# Patient Record
Sex: FEMALE | Race: WHITE | NOT HISPANIC OR LATINO | Employment: OTHER | URBAN - METROPOLITAN AREA
[De-identification: names, ages, dates, MRNs, and addresses within clinical notes are randomized per-mention and may not be internally consistent; named-entity substitution may affect disease eponyms.]

---

## 2024-05-15 ENCOUNTER — TELEPHONE (OUTPATIENT)
Dept: PRIMARY CARE | Facility: CLINIC | Age: 79
End: 2024-05-15
Payer: MEDICARE

## 2024-05-17 ENCOUNTER — TELEPHONE (OUTPATIENT)
Dept: PRIMARY CARE | Facility: CLINIC | Age: 79
End: 2024-05-17
Payer: MEDICARE

## 2024-05-17 DIAGNOSIS — E78.5 HYPERLIPIDEMIA, UNSPECIFIED HYPERLIPIDEMIA TYPE: ICD-10-CM

## 2024-05-17 DIAGNOSIS — R53.83 FATIGUE, UNSPECIFIED TYPE: ICD-10-CM

## 2024-05-17 DIAGNOSIS — E55.9 VITAMIN D DEFICIENCY: Primary | ICD-10-CM

## 2024-05-17 DIAGNOSIS — F32.A DEPRESSION, UNSPECIFIED DEPRESSION TYPE: ICD-10-CM

## 2024-05-17 DIAGNOSIS — E53.8 VITAMIN B12 DEFICIENCY: ICD-10-CM

## 2024-05-20 ENCOUNTER — TELEPHONE (OUTPATIENT)
Dept: PRIMARY CARE | Facility: CLINIC | Age: 79
End: 2024-05-20
Payer: MEDICARE

## 2024-05-20 NOTE — TELEPHONE ENCOUNTER
[place] [facility] [place] to know if she needed to  orders.   I called pt and left message that I would fax them to [facility].

## 2024-05-23 LAB
NON-UH HIE A/G RATIO: 1.2
NON-UH HIE ALB: 3.9 G/DL (ref 3.4–5)
NON-UH HIE ALK PHOS: 96 UNIT/L (ref 45–117)
NON-UH HIE BASO COUNT: 0.03 X1000 (ref 0–0.2)
NON-UH HIE BASOS %: 0.6 %
NON-UH HIE BILIRUBIN, TOTAL: 0.9 MG/DL (ref 0.3–1.2)
NON-UH HIE BUN/CREAT RATIO: 35
NON-UH HIE BUN: 28 MG/DL (ref 9–23)
NON-UH HIE CALCIUM: 9.6 MG/DL (ref 8.7–10.4)
NON-UH HIE CALCULATED LDL CHOLESTEROL: 147 MG/DL (ref 60–130)
NON-UH HIE CALCULATED OSMOLALITY: 289 MOSM/KG (ref 275–295)
NON-UH HIE CHLORIDE: 110 MMOL/L (ref 98–107)
NON-UH HIE CHOLESTEROL: 220 MG/DL (ref 100–200)
NON-UH HIE CO2, VENOUS: 26 MMOL/L (ref 20–31)
NON-UH HIE CREATININE: 0.8 MG/DL (ref 0.5–0.8)
NON-UH HIE DIFF?: NO
NON-UH HIE EOS COUNT: 0.14 X1000 (ref 0–0.5)
NON-UH HIE EOSIN %: 2.8 %
NON-UH HIE GFR AA: >60
NON-UH HIE GLOBULIN: 3.2 G/DL
NON-UH HIE GLOMERULAR FILTRATION RATE: >60 ML/MIN/1.73M?
NON-UH HIE GLUCOSE: 107 MG/DL (ref 74–106)
NON-UH HIE GOT: 19 UNIT/L (ref 15–37)
NON-UH HIE GPT: 29 UNIT/L (ref 10–49)
NON-UH HIE HCT: 43.8 % (ref 36–46)
NON-UH HIE HDL CHOLESTEROL: 48 MG/DL (ref 40–60)
NON-UH HIE HGB: 14.7 G/DL (ref 12–16)
NON-UH HIE INSTR WBC: 4.8
NON-UH HIE K: 4.5 MMOL/L (ref 3.5–5.1)
NON-UH HIE LYMPH %: 32.6 %
NON-UH HIE LYMPH COUNT: 1.55 X1000 (ref 1.2–4.8)
NON-UH HIE MCH: 31 PG (ref 27–34)
NON-UH HIE MCHC: 33.5 G/DL (ref 32–37)
NON-UH HIE MCV: 92.7 FL (ref 80–100)
NON-UH HIE MONO %: 9.9 %
NON-UH HIE MONO COUNT: 0.47 X1000 (ref 0.1–1)
NON-UH HIE MPV: 8.3 FL (ref 7.4–10.4)
NON-UH HIE NA: 142 MMOL/L (ref 135–145)
NON-UH HIE NEUTROPHIL %: 54 %
NON-UH HIE NEUTROPHIL COUNT (ANC): 2.57 X1000 (ref 1.4–8.8)
NON-UH HIE NUCLEATED RBC: 0 /100WBC
NON-UH HIE PLATELET: 185 X10 (ref 150–450)
NON-UH HIE RBC: 4.73 X10 (ref 4.2–5.4)
NON-UH HIE RDW: 13.8 % (ref 11.5–14.5)
NON-UH HIE TOTAL CHOL/HDL CHOL RATIO: 4.6
NON-UH HIE TOTAL PROTEIN: 7.1 G/DL (ref 5.7–8.2)
NON-UH HIE TRIGLYCERIDES: 123 MG/DL (ref 30–150)
NON-UH HIE TSH: 1.61 UIU/ML (ref 0.55–4.78)
NON-UH HIE VIT D 25: 50 NG/ML
NON-UH HIE VITAMIN B12: 566 PG/ML (ref 211–911)
NON-UH HIE WBC: 4.8 X10 (ref 4.5–11)

## 2024-05-30 ENCOUNTER — OFFICE VISIT (OUTPATIENT)
Dept: PRIMARY CARE | Facility: CLINIC | Age: 79
End: 2024-05-30
Payer: MEDICARE

## 2024-05-30 VITALS
TEMPERATURE: 98 F | HEART RATE: 67 BPM | DIASTOLIC BLOOD PRESSURE: 80 MMHG | BODY MASS INDEX: 26.24 KG/M2 | SYSTOLIC BLOOD PRESSURE: 124 MMHG | HEIGHT: 61 IN | WEIGHT: 139 LBS

## 2024-05-30 DIAGNOSIS — Z00.00 ROUTINE GENERAL MEDICAL EXAMINATION AT HEALTH CARE FACILITY: Primary | ICD-10-CM

## 2024-05-30 DIAGNOSIS — F51.01 PRIMARY INSOMNIA: ICD-10-CM

## 2024-05-30 PROBLEM — R10.9 ABDOMINAL PAIN: Status: ACTIVE | Noted: 2024-05-30

## 2024-05-30 PROBLEM — M19.049 OSTEOARTHRITIS OF HAND: Status: ACTIVE | Noted: 2024-05-30

## 2024-05-30 PROBLEM — F39 MOOD DISORDER (CMS-HCC): Status: ACTIVE | Noted: 2024-05-30

## 2024-05-30 PROBLEM — R03.0 ELEVATED BLOOD PRESSURE READING: Status: ACTIVE | Noted: 2024-05-30

## 2024-05-30 PROBLEM — M21.612 BUNION, LEFT FOOT: Status: ACTIVE | Noted: 2024-05-30

## 2024-05-30 PROBLEM — E55.9 VITAMIN D DEFICIENCY: Status: ACTIVE | Noted: 2024-05-30

## 2024-05-30 PROBLEM — F32.9 MDD (MAJOR DEPRESSIVE DISORDER): Status: ACTIVE | Noted: 2024-05-30

## 2024-05-30 PROCEDURE — 1160F RVW MEDS BY RX/DR IN RCRD: CPT | Performed by: FAMILY MEDICINE

## 2024-05-30 PROCEDURE — G0439 PPPS, SUBSEQ VISIT: HCPCS | Performed by: FAMILY MEDICINE

## 2024-05-30 PROCEDURE — 1159F MED LIST DOCD IN RCRD: CPT | Performed by: FAMILY MEDICINE

## 2024-05-30 RX ORDER — BUPROPION HYDROCHLORIDE 300 MG/1
1 TABLET ORAL DAILY
COMMUNITY
Start: 2021-05-24 | End: 2024-05-30 | Stop reason: WASHOUT

## 2024-05-30 RX ORDER — CYANOCOBALAMIN (VITAMIN B-12) 250 MCG
250 TABLET ORAL DAILY
COMMUNITY

## 2024-05-30 RX ORDER — RISANKIZUMAB-RZAA 150 MG/ML
INJECTION SUBCUTANEOUS
COMMUNITY

## 2024-05-30 RX ORDER — TRAZODONE HYDROCHLORIDE 50 MG/1
TABLET ORAL
Qty: 60 TABLET | Refills: 3 | Status: SHIPPED | OUTPATIENT
Start: 2024-05-30

## 2024-05-30 RX ORDER — ELECTROLYTES/DEXTROSE
1 SOLUTION, ORAL ORAL DAILY
COMMUNITY

## 2024-05-30 RX ORDER — OMEGA-3-ACID ETHYL ESTERS 1 G/1
1 CAPSULE, LIQUID FILLED ORAL 2 TIMES DAILY
COMMUNITY

## 2024-05-30 RX ORDER — ACETAMINOPHEN 500 MG
1 TABLET ORAL DAILY
COMMUNITY

## 2024-05-30 RX ORDER — MELOXICAM 15 MG/1
15 TABLET ORAL EVERY OTHER DAY
COMMUNITY

## 2024-05-30 RX ORDER — USTEKINUMAB 45 MG/.5ML
INJECTION, SOLUTION SUBCUTANEOUS
COMMUNITY
End: 2024-05-30 | Stop reason: WASHOUT

## 2024-05-30 RX ORDER — LIDOCAINE AND PRILOCAINE 25; 25 MG/G; MG/G
CREAM TOPICAL ONCE
COMMUNITY

## 2024-05-30 ASSESSMENT — PATIENT HEALTH QUESTIONNAIRE - PHQ9
1. LITTLE INTEREST OR PLEASURE IN DOING THINGS: NOT AT ALL
SUM OF ALL RESPONSES TO PHQ9 QUESTIONS 1 AND 2: 0
2. FEELING DOWN, DEPRESSED OR HOPELESS: NOT AT ALL

## 2024-05-30 NOTE — PROGRESS NOTES
"    /80   Pulse 67   Temp 36.7 °C (98 °F)   Ht 1.549 m (5' 1\")   Wt 63 kg (139 lb)   BMI 26.26 kg/m²     No past medical history on file.    Patient Active Problem List   Diagnosis    Abdominal pain    Bunion, left foot    Elevated blood pressure reading    MDD (major depressive disorder)    Mood disorder (CMS-HCC)    Osteoarthritis of hand    Vitamin D deficiency       Current Outpatient Medications   Medication Sig Dispense Refill    biotin 5 mg capsule Take 1 capsule (5 mg) by mouth once daily.      cholecalciferol (Vitamin D-3) 50 mcg (2,000 unit) capsule Take 1 capsule (50 mcg) by mouth once daily.      cyanocobalamin (Vitamin B-12) 250 mcg tablet Take 1 tablet (250 mcg) by mouth once daily.      meloxicam (Mobic) 15 mg tablet Take 1 tablet (15 mg) by mouth every other day.      omega-3 acid ethyl esters (Lovaza) 1 gram capsule Take 1 capsule (1 g) by mouth 2 times a day.      risankizumab-rzaa (Skyrizi) 150 mg/mL pen injector pen Inject under the skin.      buPROPion XL (Wellbutrin XL) 300 mg 24 hr tablet Take 1 tablet (300 mg) by mouth once daily.      lidocaine-prilocaine (Emla) cream Apply topically 1 time.      ustekinumab (Stelara) injection Inject under the skin.       No current facility-administered medications for this visit.       CC/HPI/ASSESSMENT/PLAN    CC annual wellness visit    HPI patient 79-year-old female here for wellness visit.  No cognitive deficits noted.  She is up-to-date with colon cancer screening and mammography.  Declines vaccines.  Request refills medication.  Using trazodone for her insomnia.  Patient notes she stopped Wellbutrin and is feeling well without it.  Patient concerned regarding her weight, we had lengthy conversation regarding dietary changes decrease caloric intake to allow for weight loss.  She denies headache fever chest pain palpitation short of breath abdominal pain diarrhea blood in urine or stool.  ROS negative septa noted above.  Past medical social " "surgical history is reviewed    Exam calm eyes no jaundice mouth moist neck supple no LAD no goiter no carotid bruit.  Lungs CTA good air exchange.  CV RRR no murmur abdomen soft Ext no edema full ROM of all extremities skin no rash neuro alert and oriented no focal neurologic deficit noted no cognitive deficits noted.  Psych calm pleasant female no psychosis noted    A/P 1.  Annual wellness visit.  No cognitive deficits noted.  Mammogram and colonoscopy up-to-date.  She declines vaccines.  Medicines refilled.  Healthy diet regular exercise decrease caloric intake to allow for weight loss discussed.  Follow-up 1 year or sooner as needed.    There are no diagnoses linked to this encounter. Subjective   Reason for Visit: Leonie Su is an 79 y.o. female here for a Medicare Wellness visit.          Reviewed all medications by prescribing practitioner or clinical pharmacist (such as prescriptions, OTCs, herbal therapies and supplements) and documented in the medical record.    HPI    Patient Care Team:  Fabrice Cohen MD as PCP - General  Fabrice Cohen MD as PCP - [employer] PCP     Review of Systems    Objective   Vitals:  /80   Pulse 67   Temp 36.7 °C (98 °F)   Ht 1.549 m (5' 1\")   Wt 63 kg (139 lb)   BMI 26.26 kg/m²       Physical Exam    Assessment/Plan   Problem List Items Addressed This Visit    None  Visit Diagnoses       Routine general medical examination at health care facility    -  Primary                 "

## 2024-07-25 ENCOUNTER — OFFICE VISIT (OUTPATIENT)
Dept: PRIMARY CARE | Facility: CLINIC | Age: 79
End: 2024-07-25
Payer: MEDICARE

## 2024-07-25 VITALS
TEMPERATURE: 98.3 F | HEART RATE: 96 BPM | HEIGHT: 61 IN | BODY MASS INDEX: 26.47 KG/M2 | SYSTOLIC BLOOD PRESSURE: 156 MMHG | DIASTOLIC BLOOD PRESSURE: 64 MMHG | WEIGHT: 140.2 LBS

## 2024-07-25 DIAGNOSIS — I10 PRIMARY HYPERTENSION: Primary | ICD-10-CM

## 2024-07-25 PROCEDURE — 3077F SYST BP >= 140 MM HG: CPT | Performed by: FAMILY MEDICINE

## 2024-07-25 PROCEDURE — 1124F ACP DISCUSS-NO DSCNMKR DOCD: CPT | Performed by: FAMILY MEDICINE

## 2024-07-25 PROCEDURE — 99213 OFFICE O/P EST LOW 20 MIN: CPT | Performed by: FAMILY MEDICINE

## 2024-07-25 PROCEDURE — 3078F DIAST BP <80 MM HG: CPT | Performed by: FAMILY MEDICINE

## 2024-07-25 RX ORDER — METOPROLOL SUCCINATE 25 MG/1
25 TABLET, EXTENDED RELEASE ORAL DAILY
Qty: 30 TABLET | Refills: 2 | Status: SHIPPED | OUTPATIENT
Start: 2024-07-25 | End: 2025-01-21

## 2024-07-25 ASSESSMENT — PATIENT HEALTH QUESTIONNAIRE - PHQ9
1. LITTLE INTEREST OR PLEASURE IN DOING THINGS: NOT AT ALL
2. FEELING DOWN, DEPRESSED OR HOPELESS: NOT AT ALL
SUM OF ALL RESPONSES TO PHQ9 QUESTIONS 1 AND 2: 0
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
## 2024-08-01 PROBLEM — I10 PRIMARY HYPERTENSION: Status: ACTIVE | Noted: 2024-08-01

## 2024-10-25 ENCOUNTER — APPOINTMENT (OUTPATIENT)
Dept: PRIMARY CARE | Facility: CLINIC | Age: 79
End: 2024-10-25
Payer: MEDICARE

## 2024-11-22 ENCOUNTER — APPOINTMENT (OUTPATIENT)
Dept: PRIMARY CARE | Facility: CLINIC | Age: 79
End: 2024-11-22
Payer: MEDICARE

## 2024-11-22 VITALS
WEIGHT: 143 LBS | SYSTOLIC BLOOD PRESSURE: 176 MMHG | TEMPERATURE: 98 F | BODY MASS INDEX: 27 KG/M2 | DIASTOLIC BLOOD PRESSURE: 84 MMHG | HEART RATE: 69 BPM | HEIGHT: 61 IN

## 2024-11-22 DIAGNOSIS — F32.A ANXIETY AND DEPRESSION: Primary | ICD-10-CM

## 2024-11-22 DIAGNOSIS — I10 PRIMARY HYPERTENSION: ICD-10-CM

## 2024-11-22 DIAGNOSIS — F41.9 ANXIETY AND DEPRESSION: Primary | ICD-10-CM

## 2024-11-22 PROBLEM — S01.81XA FACIAL LACERATION: Status: ACTIVE | Noted: 2024-07-24
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
## 2024-11-22 PROBLEM — H81.10 BENIGN PAROXYSMAL POSITIONAL VERTIGO: Status: ACTIVE | Noted: 2024-11-22

## 2024-11-22 PROBLEM — R53.83 FATIGUE: Status: ACTIVE | Noted: 2024-11-22

## 2024-11-22 PROBLEM — S09.90XA CLOSED HEAD INJURY: Status: ACTIVE | Noted: 2024-07-24

## 2024-11-22 PROCEDURE — 1123F ACP DISCUSS/DSCN MKR DOCD: CPT | Performed by: FAMILY MEDICINE

## 2024-11-22 PROCEDURE — 99214 OFFICE O/P EST MOD 30 MIN: CPT | Performed by: FAMILY MEDICINE

## 2024-11-22 PROCEDURE — 3079F DIAST BP 80-89 MM HG: CPT | Performed by: FAMILY MEDICINE

## 2024-11-22 PROCEDURE — 3077F SYST BP >= 140 MM HG: CPT | Performed by: FAMILY MEDICINE

## 2024-11-22 PROCEDURE — 1159F MED LIST DOCD IN RCRD: CPT | Performed by: FAMILY MEDICINE

## 2024-11-22 RX ORDER — METOPROLOL SUCCINATE 25 MG/1
25 TABLET, EXTENDED RELEASE ORAL DAILY
Qty: 30 TABLET | Refills: 2 | Status: SHIPPED | OUTPATIENT
Start: 2024-11-22 | End: 2025-05-21

## 2024-11-22 RX ORDER — MECLIZINE HYDROCHLORIDE 25 MG/1
TABLET ORAL EVERY 12 HOURS
COMMUNITY
Start: 2023-03-16 | End: 2024-11-22 | Stop reason: WASHOUT

## 2024-11-22 RX ORDER — FLUOXETINE 10 MG/1
0.5 CAPSULE ORAL DAILY
COMMUNITY
End: 2024-11-22 | Stop reason: WASHOUT

## 2024-11-22 RX ORDER — SERTRALINE HYDROCHLORIDE 50 MG/1
50 TABLET, FILM COATED ORAL DAILY
Qty: 30 TABLET | Refills: 1 | Status: SHIPPED | OUTPATIENT
Start: 2024-11-22 | End: 2025-01-21

## 2024-11-22 ASSESSMENT — PATIENT HEALTH QUESTIONNAIRE - PHQ9
SUM OF ALL RESPONSES TO PHQ9 QUESTIONS 1 AND 2: 6
1. LITTLE INTEREST OR PLEASURE IN DOING THINGS: NEARLY EVERY DAY
2. FEELING DOWN, DEPRESSED OR HOPELESS: NEARLY EVERY DAY
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
## 2024-11-27 ENCOUNTER — TELEPHONE (OUTPATIENT)
Dept: PRIMARY CARE | Facility: CLINIC | Age: 79
End: 2024-11-27
Payer: MEDICARE

## 2024-11-27 DIAGNOSIS — F41.9 ANXIETY AND DEPRESSION: ICD-10-CM

## 2024-11-27 DIAGNOSIS — F32.A ANXIETY AND DEPRESSION: ICD-10-CM

## 2024-11-27 NOTE — TELEPHONE ENCOUNTER
Pt states that she lost her bottle of Zoloft that she is supposed to start on Friday. She states that she has looked everywhere and can not find it.    Medicine refilled

## 2024-11-29 RX ORDER — SERTRALINE HYDROCHLORIDE 50 MG/1
50 TABLET, FILM COATED ORAL DAILY
Qty: 30 TABLET | Refills: 1 | Status: SHIPPED | OUTPATIENT
Start: 2024-11-29 | End: 2025-01-28

## 2024-12-02 ENCOUNTER — TELEPHONE (OUTPATIENT)
Dept: PRIMARY CARE | Facility: CLINIC | Age: 79
End: 2024-12-02
Payer: MEDICARE

## 2025-01-02 ENCOUNTER — TELEPHONE (OUTPATIENT)
Dept: CARDIOLOGY | Facility: CLINIC | Age: 80
End: 2025-01-02
Payer: MEDICARE

## 2025-01-02 NOTE — TELEPHONE ENCOUNTER
Pt left message stating that she has tried zoloft for 3 weeks and she is not tolerating the medication. She is having diarrhea. She stated that she would try and keep her upcoming appt to discuss with you

## 2025-01-03 ENCOUNTER — TELEPHONE (OUTPATIENT)
Dept: PRIMARY CARE | Facility: CLINIC | Age: 80
End: 2025-01-03
Payer: MEDICARE

## 2025-01-03 DIAGNOSIS — F41.9 ANXIETY AND DEPRESSION: Primary | ICD-10-CM

## 2025-01-03 DIAGNOSIS — F32.A ANXIETY AND DEPRESSION: Primary | ICD-10-CM

## 2025-01-03 RX ORDER — DULOXETIN HYDROCHLORIDE 30 MG/1
30 CAPSULE, DELAYED RELEASE ORAL DAILY
Qty: 30 CAPSULE | Refills: 0 | Status: SHIPPED | OUTPATIENT
Start: 2025-01-03 | End: 2025-02-02

## 2025-01-03 NOTE — TELEPHONE ENCOUNTER
Stop the Zoloft.  I can order different medicine to try for the next couple weeks before her next visit

## 2025-01-20 ENCOUNTER — APPOINTMENT (OUTPATIENT)
Dept: PRIMARY CARE | Facility: CLINIC | Age: 80
End: 2025-01-20
Payer: MEDICARE

## 2025-02-10 ENCOUNTER — TELEPHONE (OUTPATIENT)
Dept: PRIMARY CARE | Facility: CLINIC | Age: 80
End: 2025-02-10
Payer: MEDICARE

## 2025-02-10 DIAGNOSIS — F41.9 ANXIETY AND DEPRESSION: ICD-10-CM

## 2025-02-10 DIAGNOSIS — F32.A ANXIETY AND DEPRESSION: ICD-10-CM

## 2025-02-10 RX ORDER — DULOXETIN HYDROCHLORIDE 30 MG/1
30 CAPSULE, DELAYED RELEASE ORAL DAILY
Qty: 30 CAPSULE | Refills: 1 | Status: SHIPPED | OUTPATIENT
Start: 2025-02-10 | End: 2025-04-11

## 2025-02-10 NOTE — TELEPHONE ENCOUNTER
Pt stated that she has a follow-up appt with you on 3/10 and will be out of her Cymbalta by then and would like to know if you can refill the medication as she will run out before.    Medication refilled

## 2025-03-10 ENCOUNTER — APPOINTMENT (OUTPATIENT)
Dept: PRIMARY CARE | Facility: CLINIC | Age: 80
End: 2025-03-10
Payer: MEDICARE

## 2025-03-10 VITALS
TEMPERATURE: 97.5 F | WEIGHT: 141.6 LBS | HEART RATE: 84 BPM | HEIGHT: 61 IN | SYSTOLIC BLOOD PRESSURE: 160 MMHG | BODY MASS INDEX: 26.73 KG/M2 | DIASTOLIC BLOOD PRESSURE: 76 MMHG

## 2025-03-10 DIAGNOSIS — F32.A ANXIETY AND DEPRESSION: Primary | ICD-10-CM

## 2025-03-10 DIAGNOSIS — F41.9 ANXIETY AND DEPRESSION: Primary | ICD-10-CM

## 2025-03-10 DIAGNOSIS — I10 PRIMARY HYPERTENSION: ICD-10-CM

## 2025-03-10 PROCEDURE — 3078F DIAST BP <80 MM HG: CPT | Performed by: FAMILY MEDICINE

## 2025-03-10 PROCEDURE — 3077F SYST BP >= 140 MM HG: CPT | Performed by: FAMILY MEDICINE

## 2025-03-10 PROCEDURE — 1123F ACP DISCUSS/DSCN MKR DOCD: CPT | Performed by: FAMILY MEDICINE

## 2025-03-10 PROCEDURE — 1159F MED LIST DOCD IN RCRD: CPT | Performed by: FAMILY MEDICINE

## 2025-03-10 PROCEDURE — 99214 OFFICE O/P EST MOD 30 MIN: CPT | Performed by: FAMILY MEDICINE

## 2025-03-10 RX ORDER — DOXYCYCLINE HYCLATE 20 MG
TABLET ORAL
COMMUNITY
Start: 2024-12-17

## 2025-03-10 RX ORDER — DULOXETIN HYDROCHLORIDE 30 MG/1
30 CAPSULE, DELAYED RELEASE ORAL DAILY
Qty: 30 CAPSULE | Refills: 1 | Status: SHIPPED | OUTPATIENT
Start: 2025-03-10 | End: 2025-05-09

## 2025-03-10 RX ORDER — METOPROLOL SUCCINATE 25 MG/1
25 TABLET, EXTENDED RELEASE ORAL DAILY
Qty: 30 TABLET | Refills: 2 | Status: SHIPPED | OUTPATIENT
Start: 2025-03-10 | End: 2025-09-06

## 2025-03-10 RX ORDER — ACYCLOVIR 400 MG/1
TABLET ORAL
COMMUNITY
Start: 2025-02-03

## 2025-03-10 ASSESSMENT — PATIENT HEALTH QUESTIONNAIRE - PHQ9
SUM OF ALL RESPONSES TO PHQ9 QUESTIONS 1 AND 2: 0
1. LITTLE INTEREST OR PLEASURE IN DOING THINGS: NOT AT ALL
2. FEELING DOWN, DEPRESSED OR HOPELESS: NOT AT ALL
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
## 2025-06-19 LAB
NON-UH HIE ALB: 4.2 G/DL (ref 3.4–5)
NON-UH HIE ALK PHOS: 85 UNIT/L (ref 45–117)
NON-UH HIE BASO COUNT: 0.04 X1000 (ref 0–0.2)
NON-UH HIE BASOS %: 0.7 %
NON-UH HIE BILIRUBIN, DIRECT: 0.16 MG/DL (ref 0–0.4)
NON-UH HIE BILIRUBIN, TOTAL: 0.8 MG/DL (ref 0.3–1.2)
NON-UH HIE BUN/CREAT RATIO: 25.6
NON-UH HIE BUN: 23 MG/DL (ref 9–23)
NON-UH HIE CALCIUM: 9.9 MG/DL (ref 8.7–10.4)
NON-UH HIE CALCULATED LDL CHOLESTEROL: 145 MG/DL (ref 60–130)
NON-UH HIE CALCULATED OSMOLALITY: 282 MOSM/KG (ref 275–295)
NON-UH HIE CHLORIDE: 105 MMOL/L (ref 98–107)
NON-UH HIE CHOLESTEROL: 226 MG/DL (ref 100–200)
NON-UH HIE CO2, VENOUS: 23 MMOL/L (ref 20–31)
NON-UH HIE CREATININE: 0.9 MG/DL (ref 0.5–0.8)
NON-UH HIE DIFF?: ABNORMAL
NON-UH HIE DIRECT LDL: 164 MG/DL
NON-UH HIE EOS COUNT: 0.16 X1000 (ref 0–0.5)
NON-UH HIE EOSIN %: 2.6 %
NON-UH HIE GFR AA: >60
NON-UH HIE GLOMERULAR FILTRATION RATE: >60 ML/MIN/1.73M?
NON-UH HIE GLUCOSE: 105 MG/DL (ref 74–106)
NON-UH HIE GOT: 22 UNIT/L (ref 15–37)
NON-UH HIE GPT: 29 UNIT/L (ref 10–49)
NON-UH HIE HCT: 47 % (ref 36–46)
NON-UH HIE HDL CHOLESTEROL: 49 MG/DL (ref 40–60)
NON-UH HIE HEPATITIS B CORE ANTIBODY, IGM: NORMAL
NON-UH HIE HEPATITIS B SURFACE ANTIBODY QUANT: 344.1
NON-UH HIE HEPATITIS B SURFACE ANTIBODY: ABNORMAL
NON-UH HIE HEPATITIS B SURFACE ANTIGEN: NORMAL
NON-UH HIE HEPATITIS C ANTIBODY: NORMAL
NON-UH HIE HGB: 15.9 G/DL (ref 12–16)
NON-UH HIE INSTR WBC: 6
NON-UH HIE K: 5.1 MMOL/L (ref 3.5–5.1)
NON-UH HIE LYMPH %: 30.2 %
NON-UH HIE LYMPH COUNT: 1.82 X1000 (ref 1.2–4.8)
NON-UH HIE MCH: 31.9 PG (ref 27–34)
NON-UH HIE MCHC: 33.9 G/DL (ref 32–37)
NON-UH HIE MCV: 94.2 FL (ref 80–100)
NON-UH HIE MONO %: 11 %
NON-UH HIE MONO COUNT: 0.66 X1000 (ref 0.1–1)
NON-UH HIE MPV: 8.6 FL (ref 7.4–10.4)
NON-UH HIE NA: 139 MMOL/L (ref 135–145)
NON-UH HIE NEUTROPHIL %: 55.6 %
NON-UH HIE NEUTROPHIL COUNT (ANC): 3.34 X1000 (ref 1.4–8.8)
NON-UH HIE NUCLEATED RBC: 0 /100WBC
NON-UH HIE PLATELET: 208 X10 (ref 150–450)
NON-UH HIE RBC: 4.99 X10 (ref 4.2–5.4)
NON-UH HIE RDW: 13.5 % (ref 11.5–14.5)
NON-UH HIE TOTAL CHOL/HDL CHOL RATIO: 4.6
NON-UH HIE TOTAL PROTEIN: 7.7 G/DL (ref 5.7–8.2)
NON-UH HIE TRIGLYCERIDES: 161 MG/DL (ref 30–150)
NON-UH HIE WBC: 6 X10 (ref 4.5–11)

## 2025-06-21 LAB
NON-UH HIE QUANTIFERON MITOGEN MINUS NIL: 9.94 IU/ML
NON-UH HIE QUANTIFERON NIL: 0.06 IU/ML
NON-UH HIE QUANTIFERON PLUS TB1 MINUS NIL: 0.13 IU/ML
NON-UH HIE QUANTIFERON PLUS TB2 MINUS NIL: 0.07 IU/ML
NON-UH HIE QUANTIFERON TB GOLD PLUS: NEGATIVE

## 2025-09-04 DIAGNOSIS — Z00.00 WELL ADULT EXAM: ICD-10-CM
